# Patient Record
Sex: FEMALE | Race: BLACK OR AFRICAN AMERICAN | NOT HISPANIC OR LATINO | Employment: UNEMPLOYED | URBAN - METROPOLITAN AREA
[De-identification: names, ages, dates, MRNs, and addresses within clinical notes are randomized per-mention and may not be internally consistent; named-entity substitution may affect disease eponyms.]

---

## 2019-09-11 ENCOUNTER — HOSPITAL ENCOUNTER (EMERGENCY)
Facility: HOSPITAL | Age: 34
Discharge: HOME | End: 2019-09-11
Attending: EMERGENCY MEDICINE
Payer: COMMERCIAL

## 2019-09-11 VITALS
OXYGEN SATURATION: 100 % | TEMPERATURE: 98 F | BODY MASS INDEX: 23.9 KG/M2 | DIASTOLIC BLOOD PRESSURE: 78 MMHG | SYSTOLIC BLOOD PRESSURE: 136 MMHG | HEIGHT: 64 IN | HEART RATE: 86 BPM | WEIGHT: 140 LBS | RESPIRATION RATE: 18 BRPM

## 2019-09-11 DIAGNOSIS — S92.515D CLOSED NONDISPLACED FRACTURE OF PROXIMAL PHALANX OF LESSER TOE OF LEFT FOOT WITH ROUTINE HEALING, SUBSEQUENT ENCOUNTER: Primary | ICD-10-CM

## 2019-09-11 PROCEDURE — 99283 EMERGENCY DEPT VISIT LOW MDM: CPT | Mod: 25

## 2019-09-11 NOTE — ED TRIAGE NOTES
Pt presents to ED and reports L 4th toe pain. Pt states she hit her L 4th toe on the sofa Sat. She states she did go to [facility]. Pt states she has been taking ibuprofen with some relief.

## 2019-09-11 NOTE — ED PROVIDER NOTES
Encounter Date: 9/11/2019       History     Chief Complaint   Patient presents with    Toe Pain     left 5th toe pain after hitting it on the sofa     Time seen by provider: 12:06 PM    34 y.o. female who presents with complaint of 4th toe on L foot that she hit on the back corner of a sofa when she was running after her son on Saturday (09/07). The patient denies any other symptoms. She has been taking Ibuprofen and is requesting a splint to protect her toe from re-injury.  Patient went to urgent care on Saturday and was told that her toe was broken.  They did not give her anything to help with her toe.      The history is provided by the patient.     Review of patient's allergies indicates:  No Known Allergies  History reviewed. No pertinent past medical history.  Past Surgical History:   Procedure Laterality Date    ROOT CANAL       Family History   Problem Relation Age of Onset    Hypertension Mother     Hypertension Father     Breast cancer Sister         diagnosed age 36    Diabetes Neg Hx     Colon cancer Neg Hx     Ovarian cancer Neg Hx      Social History     Tobacco Use    Smoking status: Never Smoker   Substance Use Topics    Alcohol use: No    Drug use: No     Review of Systems   Constitutional: Negative for fever.   HENT: Negative for sore throat.    Respiratory: Negative for shortness of breath.    Cardiovascular: Negative for chest pain.   Gastrointestinal: Negative for nausea.   Genitourinary: Negative for dysuria.   Musculoskeletal: Positive for arthralgias and joint swelling. Negative for back pain.   Skin: Positive for color change (Ecchymosis to left 4th toe). Negative for rash.   Neurological: Negative for weakness.   Hematological: Does not bruise/bleed easily.   All other systems reviewed and are negative.    Physical Exam     Initial Vitals [09/11/19 1141]   BP Pulse Resp Temp SpO2   136/78 86 18 98.4 °F (36.9 °C) 100 %      MAP       --         Physical Exam    Nursing note and  vitals reviewed.  Constitutional: She appears well-developed and well-nourished.   HENT:   Head: Normocephalic and atraumatic.   Eyes: Conjunctivae and EOM are normal. Pupils are equal, round, and reactive to light.   Cardiovascular: Normal rate, regular rhythm, normal heart sounds and intact distal pulses. Exam reveals no gallop and no friction rub.    No murmur heard.  Pulmonary/Chest: Breath sounds normal. No respiratory distress. She has no wheezes. She has no rhonchi. She has no rales. She exhibits no tenderness.   Musculoskeletal: She exhibits edema and tenderness.        Left foot: Left 4th toe: Exhibits ecchymosis, swelling and tenderness.   Neurological: GCS eye subscore is 4. GCS verbal subscore is 5. GCS motor subscore is 6.       ED Course   Procedures  Labs Reviewed   POCT URINE PREGNANCY           Medical Decision Making:   Initial Assessment:   34 y.o. female who presents with complaint of 4th toe on L foot that she hit on the back corner of a sofa when she was running after her son on Saturday (09/07). The patient denies any other symptoms. She has been taking Ibuprofen and is requesting a splint to protect her toe from re-injury.  Patient went to urgent care on Saturday and was told that her toe was broken.  They did not give her anything to help with her toe.  Differential Diagnosis:   Toe fracture, ecchymosis of toe, edema toe  ED Management:  Patient examined and has pain to her left 4th toe.  Patient has a known fracture.  Per report from Urgent Care she has a proximal 4th phalanx fracture.  Patient was given a hard sole shoe and advised to take ibuprofen as needed for pain.  Patient has also been referred to Podiatry for further evaluation her toe. Patient given strict return precautions and voiced understanding of all discharge instructions. Pt was stable at discharge.                        ED Course as of Sep 11 1306   Wed Sep 11, 2019   1145 BP: 136/78 [AT]   1145 Temp: 98.4 °F (36.9 °C)  [AT]   1145 Temp src: Oral [AT]   1145 Pulse: 86 [AT]   1145 Resp: 18 [AT]   1145 SpO2: 100 % [AT]      ED Course User Index  [AT] EAMON Henson     Clinical Impression:       ICD-10-CM ICD-9-CM   1. Closed nondisplaced fracture of proximal phalanx of lesser toe of left foot with routine healing, subsequent encounter S92.515D V54.19           Scribe Attestation This document was produced by a scribe under my direction and in my presence. I agree with the content of the note and have made any necessary edits.     Juanita Guerrier, KESHA, FNP-BC                     EAMON Henson  09/11/19 1311